# Patient Record
Sex: MALE | Employment: OTHER | URBAN - METROPOLITAN AREA
[De-identification: names, ages, dates, MRNs, and addresses within clinical notes are randomized per-mention and may not be internally consistent; named-entity substitution may affect disease eponyms.]

---

## 2019-12-27 PROBLEM — Z12.12 ENCOUNTER FOR COLORECTAL CANCER SCREENING: Status: ACTIVE | Noted: 2019-12-27

## 2019-12-27 PROBLEM — Z12.11 ENCOUNTER FOR COLORECTAL CANCER SCREENING: Status: ACTIVE | Noted: 2019-12-27

## 2021-03-26 DIAGNOSIS — Z23 ENCOUNTER FOR IMMUNIZATION: ICD-10-CM

## 2024-02-21 PROBLEM — Z12.12 ENCOUNTER FOR COLORECTAL CANCER SCREENING: Status: RESOLVED | Noted: 2019-12-27 | Resolved: 2024-02-21

## 2024-02-21 PROBLEM — Z12.11 ENCOUNTER FOR COLORECTAL CANCER SCREENING: Status: RESOLVED | Noted: 2019-12-27 | Resolved: 2024-02-21

## 2024-05-23 ENCOUNTER — NEW PATIENT COMPREHENSIVE (OUTPATIENT)
Dept: URBAN - METROPOLITAN AREA CLINIC 6 | Facility: CLINIC | Age: 59
End: 2024-05-23

## 2024-05-23 DIAGNOSIS — H43.812: ICD-10-CM

## 2024-05-23 DIAGNOSIS — H43.12: ICD-10-CM

## 2024-05-23 PROCEDURE — 92250 FUNDUS PHOTOGRAPHY W/I&R: CPT

## 2024-05-23 PROCEDURE — 92004 COMPRE OPH EXAM NEW PT 1/>: CPT

## 2024-05-23 ASSESSMENT — VISUAL ACUITY
OS_SC: 20/20
OU_SC: 20/20
OD_SC: 20/30

## 2024-05-23 ASSESSMENT — TONOMETRY
OS_IOP_MMHG: 10
OD_IOP_MMHG: 13

## 2024-10-21 ENCOUNTER — TELEPHONE (OUTPATIENT)
Age: 59
End: 2024-10-21

## 2024-10-21 NOTE — TELEPHONE ENCOUNTER
VM to schedule recall colonoscopy with Dr Lundy.    12/11/14 AQUILES RONQUILLO DB WNL 10 yr recall.

## 2024-10-23 ENCOUNTER — TELEPHONE (OUTPATIENT)
Age: 59
End: 2024-10-23

## 2024-10-23 ENCOUNTER — PREP FOR PROCEDURE (OUTPATIENT)
Age: 59
End: 2024-10-23

## 2024-10-23 DIAGNOSIS — Z12.11 ENCOUNTER FOR SCREENING COLONOSCOPY: Primary | ICD-10-CM

## 2024-10-23 NOTE — TELEPHONE ENCOUNTER
Scheduled date of colonoscopy (as of today):12/20/24  Physician performing colonoscopy:Dr. Lundy  Location of colonoscopy:[facility]  Bowel prep reviewed with patient:Emailed joana/dul prep instructions to patient's email [email or web address]   Instructions reviewed with patient by:tory  Clearances: n/a

## 2024-10-23 NOTE — TELEPHONE ENCOUNTER
10/23/24  Screened by: Nikki Carey    Referring Provider Nikkie    Pre- Screening:     There is no height or weight on file to calculate BMI.22.2  Has patient been referred for a routine screening Colonoscopy? yes  Is the patient between 45-75 years old? yes      Previous Colonoscopy yes   If yes:    Date: 2014    Facility:     Reason:         Does the patient want to see a Gastroenterologist prior to their procedure OR are they having any GI symptoms? no    Has the patient been hospitalized or had abdominal surgery in the past 6 months? no    Does the patient use supplemental oxygen? no    Does the patient take Coumadin, Lovenox, Plavix, Elliquis, Xarelto, or other blood thinning medication? no    Has the patient had a stroke, cardiac event, or stent placed in the past year? no      If patient is between 45yrs - 49yrs, please advise patient that we will have to confirm benefits & coverage with their insurance company for a routine screening colonoscopy.

## 2024-12-05 ENCOUNTER — ANESTHESIA (OUTPATIENT)
Dept: ANESTHESIOLOGY | Facility: HOSPITAL | Age: 59
End: 2024-12-05

## 2024-12-05 ENCOUNTER — ANESTHESIA EVENT (OUTPATIENT)
Dept: ANESTHESIOLOGY | Facility: HOSPITAL | Age: 59
End: 2024-12-05

## 2024-12-11 ENCOUNTER — TELEPHONE (OUTPATIENT)
Age: 59
End: 2024-12-11

## 2024-12-11 NOTE — TELEPHONE ENCOUNTER
Spoke w/PT to confirm 12/20/24 colonoscopy, PT has prep instructions.  PT aware he will receive call day prior with arrival time.

## 2024-12-20 ENCOUNTER — HOSPITAL ENCOUNTER (OUTPATIENT)
Dept: GASTROENTEROLOGY | Facility: AMBULARY SURGERY CENTER | Age: 59
Setting detail: OUTPATIENT SURGERY
End: 2024-12-20
Attending: COLON & RECTAL SURGERY
Payer: COMMERCIAL

## 2024-12-20 ENCOUNTER — ANESTHESIA EVENT (OUTPATIENT)
Dept: GASTROENTEROLOGY | Facility: AMBULARY SURGERY CENTER | Age: 59
End: 2024-12-20
Payer: COMMERCIAL

## 2024-12-20 VITALS
TEMPERATURE: 97.4 F | SYSTOLIC BLOOD PRESSURE: 107 MMHG | BODY MASS INDEX: 21.05 KG/M2 | HEART RATE: 53 BPM | RESPIRATION RATE: 16 BRPM | OXYGEN SATURATION: 98 % | HEIGHT: 66 IN | DIASTOLIC BLOOD PRESSURE: 67 MMHG | WEIGHT: 131 LBS

## 2024-12-20 DIAGNOSIS — Z12.11 ENCOUNTER FOR SCREENING COLONOSCOPY: ICD-10-CM

## 2024-12-20 PROCEDURE — G0121 COLON CA SCRN NOT HI RSK IND: HCPCS | Performed by: COLON & RECTAL SURGERY

## 2024-12-20 RX ORDER — PROPOFOL 10 MG/ML
INJECTION, EMULSION INTRAVENOUS CONTINUOUS PRN
Status: DISCONTINUED | OUTPATIENT
Start: 2024-12-20 | End: 2024-12-20

## 2024-12-20 RX ORDER — PROPOFOL 10 MG/ML
INJECTION, EMULSION INTRAVENOUS AS NEEDED
Status: DISCONTINUED | OUTPATIENT
Start: 2024-12-20 | End: 2024-12-20

## 2024-12-20 RX ORDER — LIDOCAINE HYDROCHLORIDE 10 MG/ML
INJECTION, SOLUTION EPIDURAL; INFILTRATION; INTRACAUDAL; PERINEURAL AS NEEDED
Status: DISCONTINUED | OUTPATIENT
Start: 2024-12-20 | End: 2024-12-20

## 2024-12-20 RX ORDER — SODIUM CHLORIDE, SODIUM LACTATE, POTASSIUM CHLORIDE, CALCIUM CHLORIDE 600; 310; 30; 20 MG/100ML; MG/100ML; MG/100ML; MG/100ML
INJECTION, SOLUTION INTRAVENOUS CONTINUOUS PRN
Status: DISCONTINUED | OUTPATIENT
Start: 2024-12-20 | End: 2024-12-20

## 2024-12-20 RX ADMIN — LIDOCAINE HYDROCHLORIDE 50 MG: 10 INJECTION, SOLUTION EPIDURAL; INFILTRATION; INTRACAUDAL at 08:20

## 2024-12-20 RX ADMIN — SODIUM CHLORIDE, SODIUM LACTATE, POTASSIUM CHLORIDE, AND CALCIUM CHLORIDE: .6; .31; .03; .02 INJECTION, SOLUTION INTRAVENOUS at 08:15

## 2024-12-20 RX ADMIN — PROPOFOL 140 MCG/KG/MIN: 10 INJECTION, EMULSION INTRAVENOUS at 08:21

## 2024-12-20 RX ADMIN — Medication 40 MG: at 08:25

## 2024-12-20 RX ADMIN — PROPOFOL 100 MG: 10 INJECTION, EMULSION INTRAVENOUS at 08:20

## 2024-12-20 NOTE — ANESTHESIA PREPROCEDURE EVALUATION
"Procedure:  COLONOSCOPY     - denies any chest pain, palpitations, shortness of breath, syncope, lightheadedness, seizures   - denies any recent infectious symptoms such as fevers, chills, cough   - denies taking any anticoagulation medications or any issues with bleeding, bruising, clotting    Relevant Problems   ANESTHESIA (within normal limits)      CARDIO (within normal limits)      ENDO (within normal limits)      GI/HEPATIC (within normal limits)      /RENAL (within normal limits)      GYN (within normal limits)      HEMATOLOGY (within normal limits)      MUSCULOSKELETAL (within normal limits)      NEURO/PSYCH (within normal limits)      PULMONARY (within normal limits)      No results found for: \"WBC\", \"HGB\", \"HCT\", \"MCV\", \"PLT\"  No results found for: \"NA\", \"SODIUM\", \"K\", \"CL\", \"CO2\", \"ANIONGAP\", \"AGAP\", \"BUN\", \"CREATININE\", \"GLUC\", \"GLUF\", \"CALCIUM\", \"AST\", \"ALT\", \"ALKPHOS\", \"PROT\", \"TP\", \"BILITOT\", \"TBILI\", \"EGFR\"  No results found for: \"PTT\"  No results found for: \"INR\", \"PROTIME\"    Physical Exam    Airway    Mallampati score: I  TM Distance: >3 FB  Neck ROM: full     Dental   No notable dental hx     Cardiovascular  Rhythm: regular, Rate: normal, Cardiovascular exam normal    Pulmonary  Pulmonary exam normal Breath sounds clear to auscultation    Other Findings        Anesthesia Plan  ASA Score- 1     Anesthesia Type- IV sedation with anesthesia with ASA Monitors.         Additional Monitors:     Airway Plan:            Plan Factors-Exercise tolerance (METS): >4 METS.    Chart reviewed.    Patient summary reviewed.    Patient is not a current smoker.  Patient did not smoke on day of surgery.    Obstructive sleep apnea risk education given perioperatively.        Induction- intravenous.    Postoperative Plan-         Informed Consent- Anesthetic plan and risks discussed with patient.  I personally reviewed this patient with the CRNA. Discussed and agreed on the Anesthesia Plan with the CRNA..        "

## 2024-12-20 NOTE — H&P
History and Physical   Colon and Rectal Surgery   Juan Pablo Alvarenga 59 y.o. male MRN: [id number]  Unit/Bed#:  Encounter: [id number]  12/20/24   @[facility]    No chief complaint on file.        History of Present Illness   HPI:  Juan Pablo Alvarenga is a 59 y.o. male who presents for screening colonoscopy.      Historical Information   No past medical history on file.  Past Surgical History:   Procedure Laterality Date    HI REPAIR ING HERNIA,5+Y/O,REDUCIBL Left 5/12/2016    Procedure: INGUINAL HERNIA REPAIR WITH MESH;  Surgeon: Shalom Mejia MD;  Location: [facility];  Service: General       Meds/Allergies     Not in a hospital admission.      Current Outpatient Medications:     calcium carbonate-vitamin D (OSCAL-D) 500 mg-200 units per tablet, Take 1 tablet by mouth daily., Disp: , Rfl:     multivitamin (THERAGRAN) TABS, Take 1 tablet by mouth daily., Disp: , Rfl:     No Known Allergies      Social History   Social History     Substance and Sexual Activity   Alcohol Use Yes     Social History     Substance and Sexual Activity   Drug Use Not on file     Social History     Tobacco Use   Smoking Status Never   Smokeless Tobacco Not on file         Family History: No family history on file.      Objective     Current Vitals:      No intake or output data in the 24 hours ending 12/20/24 0729    Physical Exam:  General:  Resting comfortably in bed   Eyes:Sclera anicteric  ENT: Trachea midline  Pulm:  Symmetric chest raise.  No respiratory Distress  CV:  Regular on monitor  Abdomen:  Soft NT ND  Extremities:  No clubbing/ cyanosis/ edema    Lab Results: I have personally reviewed pertinent lab results.    Imaging: Results Review Statement: No pertinent imaging studies reviewed.      ASSESSMENT:  Juan Pablo Alvarenga is a 59 y.o. male who presents for outpatient colonoscopy.      PLAN:  For colonoscopy    Risks/ Benefits reviewed to include but not limited to anesthesia, bleeding, missed lesions, and colonoscopic perforation  requiring surgery.

## 2024-12-20 NOTE — ANESTHESIA POSTPROCEDURE EVALUATION
Post-Op Assessment Note    CV Status:  Stable  Pain Score: 0    Pain management: adequate       Mental Status:  Arousable and sleepy   PONV Controlled:  None   Airway Patency:  Patent     Post Op Vitals Reviewed: Yes    No anethesia notable event occurred.    Staff: CRNA           Last Filed PACU Vitals:  Vitals Value Taken Time   Temp     Pulse 56    /78    Resp 16    SpO2 99        Modified Jeanna:  Activity: 2 (12/20/2024  7:42 AM)  Respiration: 2 (12/20/2024  7:42 AM)  Circulation: 2 (12/20/2024  7:42 AM)  Consciousness: 2 (12/20/2024  7:42 AM)  Oxygen Saturation: 2 (12/20/2024  7:42 AM)  Modified Jeanna Score: 10 (12/20/2024  7:42 AM)